# Patient Record
(demographics unavailable — no encounter records)

---

## 2024-10-23 NOTE — HISTORY OF PRESENT ILLNESS
[de-identified] : 10/23/2024 : EMILIA ANDREWS is a 54 year female presenting today for left shoulder pain since 6/2024 with no DEMETRICE, worsening since 10/21/24 when pain increased significantly. Went to [facility] 10/22/24 for XR, was told she has a large calcium deposit. Pain is anterior and associated with weakness, significant limited ROM. Worse with any movements, better at rest. Has tried PT for a few weeks, activity modification and NSAIDs with no relief.  [facility] Breast cancer survivor double mastectomy and - lymph nodes removed 2019

## 2024-10-23 NOTE — IMAGING
[Left] : left shoulder [Outside films reviewed] : Outside films reviewed [There are no fractures, subluxations or dislocations. No significant abnormalities are seen] : There are no fractures, subluxations or dislocations. No significant abnormalities are seen [Calcific density] : Calcific density [FreeTextEntry1] : NW

## 2024-10-23 NOTE — DISCUSSION/SUMMARY
[de-identified] : 54f with left shoulder calcific tendinitis 1) csi left shoulder today - tolerated well  2) cryotherapy, rest and activity modification  3) rtc 2 weeks, will plan to start PT   Entered by Sarah Jackson acting as scribe. Dr. Castillo- The documentation recorded by the scribe accurately reflects the service I personally performed and the decisions made by me.

## 2025-02-06 NOTE — HISTORY OF PRESENT ILLNESS
[FreeTextEntry1] : Ms. Traore is a 54 yo F with overweight BMI, left shoulder calcific tendonitis, history of stage 1 ductal carcinoma of the breast (s/p bilateral mastectomies) with flap reconstruction, and exercise-induced asthma, who is being seen for consultation due to hepatic cysts. She was referred by her sister, Dr. Aminah Murray.  In mid-December, she started experiencing epigastric pain and upper abdominal pain (she cannot recall if it was LUQ or RUQ) after fatty meals with radiation to her left shoulder. Because of this, she was concerned about her gallbladder and was sent for a RUQ US which showed a normal gallbladder with no stones and multiple liver cysts, prompting her referral here. We ordered and she completed an MRI of the abdomen on 1/10/25 which showed multiple lobe hepatic cysts the largest measuring up to 5.3 cm in size without evidence of enhancing component or septations. She also had labs done as requested.  Today, she reports a "sour" feeling in her epigastric region. She also has had new onset reflux over the past 2-3 months. She had altered her diet and cut out a lot of fatty foods which has significantly improved her symptoms. She had not tried any OTC antacids or any prescribed medications. She has not discussed her symptoms with her gastroenterologist yet as she last saw him about 5 years ago when she had a screening colonoscopy that she reports was normal. No prior EGD. She cannot recall the gastroenterologist's name today but says she has it at home.  She denies any nausea, vomiting, jaundice, dysphagia, unintentional weight loss, or overt GI bleeding.   She has no known family history of liver disease. She has no known family history of any GI malignancies.  She traveled to [place] 1 year ago. She got a new puppy in 5/2024. She is . She works as a . Never smoker. No alcohol consumption. No recreational drug use history.

## 2025-02-06 NOTE — HISTORY OF PRESENT ILLNESS
[FreeTextEntry1] : Ms. Traore is a 56 yo F with overweight BMI, left shoulder calcific tendonitis, history of stage 1 ductal carcinoma of the breast (s/p bilateral mastectomies) with flap reconstruction, and exercise-induced asthma, who is being seen for consultation due to hepatic cysts. She was referred by her sister, Dr. Aminah Murray.  In mid-December, she started experiencing epigastric pain and upper abdominal pain (she cannot recall if it was LUQ or RUQ) after fatty meals with radiation to her left shoulder. Because of this, she was concerned about her gallbladder and was sent for a RUQ US which showed a normal gallbladder with no stones and multiple liver cysts, prompting her referral here. We ordered and she completed an MRI of the abdomen on 1/10/25 which showed multiple lobe hepatic cysts the largest measuring up to 5.3 cm in size without evidence of enhancing component or septations. She also had labs done as requested.  Today, she reports a "sour" feeling in her epigastric region. She also has had new onset reflux over the past 2-3 months. She had altered her diet and cut out a lot of fatty foods which has significantly improved her symptoms. She had not tried any OTC antacids or any prescribed medications. She has not discussed her symptoms with her gastroenterologist yet as she last saw him about 5 years ago when she had a screening colonoscopy that she reports was normal. No prior EGD. She cannot recall the gastroenterologist's name today but says she has it at home.  She denies any nausea, vomiting, jaundice, dysphagia, unintentional weight loss, or overt GI bleeding.   She has no known family history of liver disease. She has no known family history of any GI malignancies.  She traveled to [place] 1 year ago. She got a new puppy in 5/2024. She is . She works as a . Never smoker. No alcohol consumption. No recreational drug use history.

## 2025-02-06 NOTE — PHYSICAL EXAM
[Non-Tender] : non-tender [Smooth] : smooth [General Appearance - Alert] : alert [General Appearance - In No Acute Distress] : in no acute distress [General Appearance - Well Nourished] : well nourished [General Appearance - Well Developed] : well developed [General Appearance - Well-Appearing] : healthy appearing [Sclera] : the sclera and conjunctiva were normal [Hearing Threshold Finger Rub Not [place]] : hearing was normal [Oropharynx] : the oropharynx was normal [Neck Appearance] : the appearance of the neck was normal [Neck Cervical Mass (___cm)] : no neck mass was observed [Jugular Venous Distention Increased] : there was no jugular-venous distention [Respiration, Rhythm And Depth] : normal respiratory rhythm and effort [Exaggerated Use Of Accessory Muscles For Inspiration] : no accessory muscle use [Auscultation Breath Sounds / Voice Sounds] : lungs were clear to auscultation bilaterally [Heart Rate And Rhythm] : heart rate was normal and rhythm regular [Heart Sounds] : normal S1 and S2 [Edema] : there was no peripheral edema [Bowel Sounds] : normal bowel sounds [Abdomen Soft] : soft [Abdomen Tenderness] : non-tender [Abdomen Mass (___ Cm)] : no abdominal mass palpated [Abdomen Hernia] : no hernia was discovered [Abnormal Walk] : normal gait [Nail Clubbing] : no clubbing  or cyanosis of the fingernails [Involuntary Movements] : no involuntary movements were seen [Skin Color & Pigmentation] : normal skin color and pigmentation [Skin Turgor] : normal skin turgor [] : no rash [Oriented To Time, Place, And Person] : oriented to person, place, and time [Impaired Insight] : insight and judgment were intact [Affect] : the affect was normal [Mood] : the mood was normal [Memory Recent] : recent memory was not impaired [Memory Remote] : remote memory was not impaired [Scleral Icterus] : No Scleral Icterus [Spider Angioma] : No spider angioma(s) were observed [Abdominal  Ascites] : no ascites [Splenomegaly] : no splenomegaly [Caput Medusae] : no caput medusae observed [Asterixis] : no asterixis observed [Jaundice] : No jaundice [Palmar Erythema] : no Palmar Erythema

## 2025-02-06 NOTE — REVIEW OF SYSTEMS
[As Noted in HPI] : as noted in HPI [Anxiety] : anxiety [Fever] : no fever [Chills] : no chills [Sore Throat] : no sore throat [Hoarseness] : no hoarseness [Chest Pain] : no chest pain [Palpitations] : no palpitations [Cough] : no cough [Vomiting] : no vomiting [Constipation] : no constipation [Diarrhea] : no diarrhea [Melena] : no melena [Abdominal Pain] : abdominal pain [Heartburn] : heartburn [Negative] : Gastrointestinal [Shortness Of Breath] : no shortness of breath [Itching] : no itching

## 2025-02-06 NOTE — PHYSICAL EXAM
[Non-Tender] : non-tender [Smooth] : smooth [General Appearance - Alert] : alert [General Appearance - Well Nourished] : well nourished [General Appearance - In No Acute Distress] : in no acute distress [General Appearance - Well Developed] : well developed [General Appearance - Well-Appearing] : healthy appearing [Sclera] : the sclera and conjunctiva were normal [Hearing Threshold Finger Rub Not [place]] : hearing was normal [Oropharynx] : the oropharynx was normal [Neck Appearance] : the appearance of the neck was normal [Neck Cervical Mass (___cm)] : no neck mass was observed [Jugular Venous Distention Increased] : there was no jugular-venous distention [Respiration, Rhythm And Depth] : normal respiratory rhythm and effort [Exaggerated Use Of Accessory Muscles For Inspiration] : no accessory muscle use [Auscultation Breath Sounds / Voice Sounds] : lungs were clear to auscultation bilaterally [Heart Rate And Rhythm] : heart rate was normal and rhythm regular [Heart Sounds] : normal S1 and S2 [Edema] : there was no peripheral edema [Bowel Sounds] : normal bowel sounds [Abdomen Tenderness] : non-tender [Abdomen Soft] : soft [Abdomen Mass (___ Cm)] : no abdominal mass palpated [Abdomen Hernia] : no hernia was discovered [Abnormal Walk] : normal gait [Nail Clubbing] : no clubbing  or cyanosis of the fingernails [Involuntary Movements] : no involuntary movements were seen [Skin Color & Pigmentation] : normal skin color and pigmentation [Skin Turgor] : normal skin turgor [] : no rash [Oriented To Time, Place, And Person] : oriented to person, place, and time [Impaired Insight] : insight and judgment were intact [Affect] : the affect was normal [Mood] : the mood was normal [Memory Recent] : recent memory was not impaired [Memory Remote] : remote memory was not impaired [Scleral Icterus] : No Scleral Icterus [Spider Angioma] : No spider angioma(s) were observed [Abdominal  Ascites] : no ascites [Splenomegaly] : no splenomegaly [Caput Medusae] : no caput medusae observed [Asterixis] : no asterixis observed [Jaundice] : No jaundice [Palmar Erythema] : no Palmar Erythema

## 2025-02-06 NOTE — ASSESSMENT
[FreeTextEntry1] : # Hepatic cysts: - We reviewed her recent MRI abdomen (1/18/25) in detail including showing her representative images of her cysts. The cysts appear to be simple hepatic cysts without any concerning septations or enhancing components. No heterogeneity to suggest superinfection of a cyst or recent hemorrhage. - We discussed that simple hepatic cysts are a common benign liver lesion. Asymptomatic cysts can slowly grow but do not require routine surveillance. Even for larger cysts, complications such as bleeding or superinfection are rare. - Reassurance was provided that there is no concern for any malignancy based on her MRI results. - Echinococcus Ab was also negative (1/25/25). - We discussed that, based on her description of her recent GI symptoms, they do not seem to be clearly related to her cysts, especially as her dominant cyst is in the inferior right hepatic lobe far from her stomach and therefore unlikely to be causing mass effect as she eats. We discussed that, in future, if she has symptoms that are more definitively felt to be cyst-related, she can be referred to a hepatobiliary surgeon here to consider surgical cyst defenestration which tends to provide more durable relief than percutaneous drainage or sclerotherapy.  # Upper abdominal pain and heartburn: - We discussed that, based on her description of her symptoms, they do not sound to be definitively related to her hepatic cysts (as above) nor classic for biliary colic. No gallstones, cholecystitis, or choledocholithiasis seen on recent imaging with US and MRI. - Given onset of symptoms after age 50 years, she likely warrants EGD for further evaluation of her symptoms and I advised her to follow up for this with her gastroenterologist. If EGD is reassuring, they could also consider further testing such as to look for biliary dyskinesia. - In the meantime, will start a trial of pantoprazole, 40 mg po daily for symptomatic relief.  # Health maintenance: - She is HAV and HBV non-immune with vaccinations recommended today. She plans to schedule them. - She underwent colonoscopy approximately 5 years ago with an outside gastroenterologist and reports normal findings. She was encouraged to follow-up with her GI for repeat colon cancer screening when due (likely 10 year interval if no polyps seen previously). She is average risk for colon cancer by family history. - Ms. ANDREWS was counseled to: abstain from alcohol; avoid use of herbal and dietary supplements due to potential hepatotoxicity; and limit use of acetaminophen to <2 grams per day.  Next follow-up: 3 months

## 2025-06-10 NOTE — PHYSICAL EXAM
[Alert] : alert [No Acute Distress] : no acute distress [Sclera] : the sclera and conjunctiva were normal [Oropharynx] : the oropharynx was normal [Normal Appearance] : the appearance of the neck was normal [No Respiratory Distress] : no respiratory distress [No Acc Muscle Use] : no accessory muscle use [Respiration, Rhythm And Depth] : normal respiratory rhythm and effort [Heart Rate And Rhythm] : heart rate was normal and rhythm regular [Abdomen Tenderness] : non-tender [Abdomen Soft] : soft [Rebound Tenderness] : no rebound tenderness [No CVA Tenderness] : no CVA  tenderness [Abnormal Walk] : normal gait [Normal Color / Pigmentation] : normal skin color and pigmentation [Oriented To Time, Place, And Person] : oriented to person, place, and time [Normal Affect] : the affect was normal [Normal Mood] : the mood was normal

## 2025-06-10 NOTE — HISTORY OF PRESENT ILLNESS
[FreeTextEntry1] : Patient is a 55-year-old female who presents to the gastroenterology office for evaluation.  Patient reports experiencing approximately 5 episodes of abdominal discomfort/pain over the past 6 months.  She reports the abdominal discomfort/pain was located in the left upper quadrant and occurred after she ate fatty, greasy foods.  Patient reports also experiencing left shoulder pain, which has occurred along with the abdominal discomfort/pain after eating fatty greasy foods.  She reports the abdominal discomfort/pain and shoulder pain subsequently resolved within an hour of onset during each episode.  Per chart, patient follows up with a hepatologist Dr. Lore Gaitan for hepatology care.  Dr. Gaitan has recommended an EGD for evaluation.  Per chart, Dr. Gaitan prescribed the patient pantoprazole 40 mg once a day.  During office visit today, patient reports she did not start taking the pantoprazole medication.  Patient denies experiencing any chest pain, shortness of breath, acid reflux, regurgitation, heartburn, nausea, vomiting, difficulty swallowing or painful swallowing.  Patient denies any acute changes in bowel habits.  Patient reports typically having a daily bowel movement.  Patient denies red blood in stools and denies black stools.  Patient denies unintentional weight loss.

## 2025-06-10 NOTE — ASSESSMENT
[FreeTextEntry1] : Patient is a 55-year-old female with history of left upper quadrant abdominal discomfort/pain and left shoulder pain, which began after eating fatty greasy foods and subsequently resolved within an hour of onset.  Patient reported experiencing 5 episodes of the abdominal discomfort/pain and shoulder pain over the past 6 months.  Will schedule patient for EGD (upper GI endoscopy) for evaluation.  Discussed indications, benefits/risks and alternatives to EGD with the patient.  She reported understanding and she is in agreement with proceeding with EGD.  All of her questions were answered. Will also check labs and a Helicobacter pylori breath test for evaluation.       -- CBC, CMP, amylase, lipase, celiac serology (transglutaminase IgA antibody, quantitative IgA).      -- Helicobacter pylori breath test.      -- Schedule EGD (upper GI endoscopy).

## 2025-07-14 NOTE — HISTORY OF PRESENT ILLNESS
[FreeTextEntry1] : Ms. Traore is a 56 yo F with overweight BMI, left shoulder calcific tendonitis, history of stage 1 ductal carcinoma of the breast (s/p bilateral mastectomies) with flap reconstruction, and exercise-induced asthma, who is being seen for consultation due to hepatic cysts. She was referred by her sister, Dr. Aminah Murray.  In mid-December, she started experiencing epigastric pain and upper abdominal pain (she cannot recall if it was LUQ or RUQ) after fatty meals with radiation to her left shoulder. Because of this, she was concerned about her gallbladder and was sent for a RUQ US which showed a normal gallbladder with no stones and multiple liver cysts, prompting her referral here. We ordered and she completed an MRI of the abdomen on 1/10/25 which showed multiple lobe hepatic cysts the largest measuring up to 5.3 cm in size without evidence of enhancing component or septations. She also had labs done as requested.  Today, she reports a "sour" feeling in her epigastric region. She also has had new onset reflux over the past 2-3 months. She had altered her diet and cut out a lot of fatty foods which has significantly improved her symptoms. She had not tried any OTC antacids or any prescribed medications. She has not discussed her symptoms with her gastroenterologist yet as she last saw him about 5 years ago when she had a screening colonoscopy that she reports was normal. No prior EGD. She cannot recall the gastroenterologist's name today but says she has it at home.  She denies any nausea, vomiting, jaundice, dysphagia, unintentional weight loss, or overt GI bleeding.   She has no known family history of liver disease. She has no known family history of any GI malignancies.  She traveled to [place] 1 year ago. She got a new puppy in 5/2024. She is . She works as a . Never smoker. No alcohol consumption. No recreational drug use history.

## 2025-07-14 NOTE — PHYSICAL EXAM
[Scleral Icterus] : No Scleral Icterus [Spider Angioma] : No spider angioma(s) were observed [Abdominal  Ascites] : no ascites [Splenomegaly] : no splenomegaly [Caput Medusae] : no caput medusae observed [Non-Tender] : non-tender [Smooth] : smooth [Asterixis] : no asterixis observed [Jaundice] : No jaundice [Palmar Erythema] : no Palmar Erythema [General Appearance - Alert] : alert [General Appearance - In No Acute Distress] : in no acute distress [General Appearance - Well Nourished] : well nourished [General Appearance - Well Developed] : well developed [General Appearance - Well-Appearing] : healthy appearing [Sclera] : the sclera and conjunctiva were normal [Hearing Threshold Finger Rub Not [place]] : hearing was normal [Oropharynx] : the oropharynx was normal [Neck Appearance] : the appearance of the neck was normal [Neck Cervical Mass (___cm)] : no neck mass was observed [Jugular Venous Distention Increased] : there was no jugular-venous distention [Exaggerated Use Of Accessory Muscles For Inspiration] : no accessory muscle use [Respiration, Rhythm And Depth] : normal respiratory rhythm and effort [Auscultation Breath Sounds / Voice Sounds] : lungs were clear to auscultation bilaterally [Heart Rate And Rhythm] : heart rate was normal and rhythm regular [Heart Sounds] : normal S1 and S2 [Edema] : there was no peripheral edema [Bowel Sounds] : normal bowel sounds [Abdomen Soft] : soft [Abdomen Tenderness] : non-tender [Abdomen Mass (___ Cm)] : no abdominal mass palpated [Abdomen Hernia] : no hernia was discovered [Abnormal Walk] : normal gait [Nail Clubbing] : no clubbing  or cyanosis of the fingernails [Involuntary Movements] : no involuntary movements were seen [Skin Color & Pigmentation] : normal skin color and pigmentation [Skin Turgor] : normal skin turgor [] : no rash [Oriented To Time, Place, And Person] : oriented to person, place, and time [Impaired Insight] : insight and judgment were intact [Affect] : the affect was normal [Mood] : the mood was normal [Memory Recent] : recent memory was not impaired [Memory Remote] : remote memory was not impaired

## 2025-07-14 NOTE — REVIEW OF SYSTEMS
[Shortness Of Breath] : no shortness of breath [As Noted in HPI] : as noted in HPI [Abdominal Pain] : abdominal pain [Heartburn] : heartburn [Itching] : no itching [Anxiety] : anxiety [Negative] : Heme/Lymph